# Patient Record
Sex: FEMALE | Race: WHITE | URBAN - METROPOLITAN AREA
[De-identification: names, ages, dates, MRNs, and addresses within clinical notes are randomized per-mention and may not be internally consistent; named-entity substitution may affect disease eponyms.]

---

## 2017-07-20 ENCOUNTER — TRANSFERRED RECORDS (OUTPATIENT)
Dept: HEALTH INFORMATION MANAGEMENT | Facility: CLINIC | Age: 57
End: 2017-07-20

## 2017-08-02 ENCOUNTER — TRANSFERRED RECORDS (OUTPATIENT)
Dept: HEALTH INFORMATION MANAGEMENT | Facility: CLINIC | Age: 57
End: 2017-08-02

## 2017-09-06 ENCOUNTER — TRANSFERRED RECORDS (OUTPATIENT)
Dept: HEALTH INFORMATION MANAGEMENT | Facility: CLINIC | Age: 57
End: 2017-09-06

## 2017-09-08 ENCOUNTER — TRANSFERRED RECORDS (OUTPATIENT)
Dept: HEALTH INFORMATION MANAGEMENT | Facility: CLINIC | Age: 57
End: 2017-09-08

## 2017-09-20 ENCOUNTER — TRANSFERRED RECORDS (OUTPATIENT)
Dept: HEALTH INFORMATION MANAGEMENT | Facility: CLINIC | Age: 57
End: 2017-09-20

## 2017-11-16 ENCOUNTER — OFFICE VISIT (OUTPATIENT)
Dept: RHEUMATOLOGY | Facility: CLINIC | Age: 57
End: 2017-11-16
Payer: COMMERCIAL

## 2017-11-16 VITALS — DIASTOLIC BLOOD PRESSURE: 86 MMHG | HEART RATE: 88 BPM | WEIGHT: 250 LBS | SYSTOLIC BLOOD PRESSURE: 182 MMHG

## 2017-11-16 DIAGNOSIS — M05.741 RHEUMATOID ARTHRITIS INVOLVING BOTH HANDS WITH POSITIVE RHEUMATOID FACTOR (H): Primary | ICD-10-CM

## 2017-11-16 DIAGNOSIS — M05.742 RHEUMATOID ARTHRITIS INVOLVING BOTH HANDS WITH POSITIVE RHEUMATOID FACTOR (H): Primary | ICD-10-CM

## 2017-11-16 PROCEDURE — 99203 OFFICE O/P NEW LOW 30 MIN: CPT | Performed by: INTERNAL MEDICINE

## 2017-11-16 RX ORDER — ALPRAZOLAM 1 MG
1 TABLET ORAL
COMMUNITY
Start: 2017-10-25 | End: 2018-01-23

## 2017-11-16 RX ORDER — SERTRALINE HYDROCHLORIDE 100 MG/1
100 TABLET, FILM COATED ORAL
COMMUNITY
Start: 2017-09-20 | End: 2018-03-19

## 2017-11-16 RX ORDER — TIZANIDINE 2 MG/1
TABLET ORAL
COMMUNITY
Start: 2017-09-20

## 2017-11-16 RX ORDER — ERGOCALCIFEROL 1.25 MG/1
50000 CAPSULE, LIQUID FILLED ORAL
COMMUNITY
Start: 2017-09-20 | End: 2017-12-19

## 2017-11-16 RX ORDER — BUPROPION HYDROCHLORIDE 300 MG/1
300 TABLET ORAL
COMMUNITY
Start: 2017-09-20 | End: 2018-03-19

## 2017-11-16 RX ORDER — PANTOPRAZOLE SODIUM 20 MG/1
TABLET, DELAYED RELEASE ORAL
COMMUNITY
Start: 2017-11-02

## 2017-11-16 NOTE — NURSING NOTE
Chief Complaint   Patient presents with     RECHECK     Dr. [facility]       Initial /86  Pulse 88  Wt 250 lb (113.4 kg) There is no height or weight on file to calculate BMI.      Patient prefers to be contacted via at Home.   Okay to leave detailed message: Yes  Patient uses MyChart: Yes    Alina NORRIS LPN

## 2017-11-16 NOTE — PROGRESS NOTES
CHIEF COMPLAINT:  Rheumatoid arthritis.     History:  Em Ramos is a 57-year-old female I am asked to evaluate for possible rheumatoid liver disease.  We discussed immediately rheumatoid arthritis does not affect the liver and that anything that would affect the liver would either be something along the line of autoimmune hepatitis or primary biliary cirrhosis, drug effects usually from something like methotrexate, etc.        The patient was seen for many years by Dr. Hein, perhaps for 15 years, is currently on Humira weekly, which she has done well on.  She really has no complaints currently, but does need refills of this prescription.  She does have a positive rheumatoid factor and positive CCP antibody.  It does not appear that these have necessarily been checked recently, but when last checked in this part of this workup, her NATHALY is positive at 1.4 with negative auto antibodies.  AST is 63, ALT 43.  Sed rate not checked.  CCP antibody in 2016 was negative.  Smooth muscle antibodies are negative.  Rheumatoid factor back in 2006 was 216.  She is negative for hepatitis B and C, TB testing is negative.  On further workup an ultrasound does reveal moderate severe fatty liver disease.      MEDICATIONS:   1.  Wellbutrin 300 mg daily.    2.  Humira 40 mg subcutaneously weekly.      DRUG ALLERGIES:  None.      SOCIAL HISTORY:  Not a smoker, does drink a glass of wine at least daily.      FAMILY HISTORY:  Mother also had rheumatoid arthritis.      REVIEW OF SYSTEMS:  As noted above.      PHYSICAL EXAMINATION:   VITAL SIGNS:  Blood pressure 182/86, pulse 88.   HEENT:  She is normocephalic and atraumatic.  Sclerae are clear and moist.  Oropharynx without lesions.   NECK:  Supple, without lymphadenopathy.   LUNGS:  Clear.   HEART:  Regular, without murmur.   ABDOMEN:  Nontender.   MUSCULOSKELETAL:  Joint exam there is no synovitis of the wrists, MCPs, or PIPs with the exception of there is mild synovitis of  bilateral second and third MCP joints.  There is no synovitis of the elbows, shoulders, knees or ankles.   SKIN:  Without lesions.      IMPRESSION:   1.  Seropositive rheumatoid arthritis.   2.  Fatty liver disease.      RECOMMENDATIONS:   1.  We will continue Humira 40 mg subcutaneously weekly.   2.  She should check labs every 6 months.   3.  I do not have any reason to think the rheumatoid arthritis in any way is affecting her liver.  It is likely that she has fatty liver disease as described previously.         RISHI DODSON MD             D: 2017 13:45   T: 2017 17:42   MT: [id number]      Name:     SAMRA WELCH   MRN:      [id number]        Account:      [id number]   :      1960           Visit Date:   2017      Document: [id number]       cc: Yesica Zhong NP

## 2017-11-17 ENCOUNTER — TELEPHONE (OUTPATIENT)
Dept: RHEUMATOLOGY | Facility: CLINIC | Age: 57
End: 2017-11-17

## 2017-11-17 DIAGNOSIS — M05.742 RHEUMATOID ARTHRITIS INVOLVING BOTH HANDS WITH POSITIVE RHEUMATOID FACTOR (H): ICD-10-CM

## 2017-11-17 DIAGNOSIS — M05.741 RHEUMATOID ARTHRITIS INVOLVING BOTH HANDS WITH POSITIVE RHEUMATOID FACTOR (H): ICD-10-CM

## 2017-11-17 NOTE — TELEPHONE ENCOUNTER
Reason for Call:  Medication clarification    Do you use a [facility]?  Name of the pharmacy and phone number for the current request:   Specialty Pharmacy    Name of the medication requested: Humira    Other request: Need to clarify directions - received 2 different rx - once weekly or once every 14 days?    Can we leave a detailed message on this number? YES    Phone number patient can be reached at: [phone]    Best Time: any    Call taken on 11/17/2017 at 3:10 PM by Lianne Ramsey

## 2017-11-17 NOTE — TELEPHONE ENCOUNTER
Please verify Humira is to be every 7 days   and not the standard every 14 days.    Beatriz Conley RN  [facility]

## 2018-01-10 ENCOUNTER — TELEPHONE (OUTPATIENT)
Dept: RHEUMATOLOGY | Facility: CLINIC | Age: 58
End: 2018-01-10

## 2018-01-10 NOTE — TELEPHONE ENCOUNTER
[facility] Prior Authorization Team   Phone: [phone]  Fax: [phone]    PA Initiation    Medication: Humira - PENDING   Insurance Company: [employer] - Phone [phone] Fax [phone]  Pharmacy Filling the Rx: [facility] - [address] [place] [address]  Filling Pharmacy Phone:    Filling Pharmacy Fax:    Start Date: 1/10/2018    Also sent recent chart notes.

## 2018-01-11 NOTE — TELEPHONE ENCOUNTER
Prior Authorization Approval    Authorization Effective Date: 1/10/2018  Authorization Expiration Date: 1/10/2020  Medication: Humira - APPROVED  Approved Dose/Quantity: N/A  Reference #: SEBAS CARLSON# [id number]   Insurance Company: [employer] - Phone [phone] Fax [phone]  Expected CoPay:       CoPay Card Available:      Foundation Assistance Needed:    Which Pharmacy is filling the prescription (Not needed for infusion/clinic administered): [address]  Pharmacy Notified:    Patient Notified:

## 2018-01-12 ENCOUNTER — TELEPHONE (OUTPATIENT)
Dept: RHEUMATOLOGY | Facility: CLINIC | Age: 58
End: 2018-01-12

## 2018-01-12 DIAGNOSIS — M05.742 RHEUMATOID ARTHRITIS INVOLVING BOTH HANDS WITH POSITIVE RHEUMATOID FACTOR (H): ICD-10-CM

## 2018-01-12 DIAGNOSIS — M05.741 RHEUMATOID ARTHRITIS INVOLVING BOTH HANDS WITH POSITIVE RHEUMATOID FACTOR (H): ICD-10-CM

## 2018-01-12 NOTE — TELEPHONE ENCOUNTER
[facility] faxed request stating they issa dispense the humira it need to go through [facility]. Please send new rx to [facility]    Sejal Arreola  Specialty [facility]

## 2018-01-12 NOTE — TELEPHONE ENCOUNTER
Authorization Effective Date: 1/10/2018  Authorization Expiration Date: 1/10/2020  Medication: Humira - APPROVED  Approved Dose/Quantity: N/A  Reference #: SEBAS CARLSON# [id number]   Insurance Company: [employer] - Phone [phone] Fax [phone]  Expected CoPay:       CoPay Card Available:      Foundation Assistance Needed:    Which Pharmacy is filling the prescription (Not needed for infusion/clinic administered): [address]    Remaining rx refills transferred to [place].  Beatriz Conley RN  [facility]

## 2018-01-21 ENCOUNTER — HEALTH MAINTENANCE LETTER (OUTPATIENT)
Age: 58
End: 2018-01-21

## 2020-03-11 ENCOUNTER — HEALTH MAINTENANCE LETTER (OUTPATIENT)
Age: 60
End: 2020-03-11

## 2020-12-27 ENCOUNTER — HEALTH MAINTENANCE LETTER (OUTPATIENT)
Age: 60
End: 2020-12-27

## 2021-04-25 ENCOUNTER — HEALTH MAINTENANCE LETTER (OUTPATIENT)
Age: 61
End: 2021-04-25

## 2021-10-09 ENCOUNTER — HEALTH MAINTENANCE LETTER (OUTPATIENT)
Age: 61
End: 2021-10-09

## 2022-03-26 ENCOUNTER — HEALTH MAINTENANCE LETTER (OUTPATIENT)
Age: 62
End: 2022-03-26

## 2022-05-21 ENCOUNTER — HEALTH MAINTENANCE LETTER (OUTPATIENT)
Age: 62
End: 2022-05-21

## 2022-09-17 ENCOUNTER — HEALTH MAINTENANCE LETTER (OUTPATIENT)
Age: 62
End: 2022-09-17

## 2023-06-04 ENCOUNTER — HEALTH MAINTENANCE LETTER (OUTPATIENT)
Age: 63
End: 2023-06-04

## 2025-06-09 ENCOUNTER — TRANSCRIBE ORDERS (OUTPATIENT)
Dept: OTHER | Age: 65
End: 2025-06-09

## 2025-06-09 DIAGNOSIS — K70.30 ALCOHOLIC CIRRHOSIS OF LIVER WITHOUT ASCITES (H): ICD-10-CM

## 2025-06-09 DIAGNOSIS — R74.8 ELEVATED ALKALINE PHOSPHATASE LEVEL: Primary | ICD-10-CM

## 2025-06-10 ENCOUNTER — PATIENT OUTREACH (OUTPATIENT)
Dept: CARE COORDINATION | Facility: CLINIC | Age: 65
End: 2025-06-10
Payer: COMMERCIAL

## 2025-06-19 NOTE — CONFIDENTIAL NOTE
DIAGNOSIS:    Elevated alkaline phosphatase level   Alcoholic cirrhosis of liver without ascites      Appt Date:  08.28.2025     NOTES STATUS DETAILS   OFFICE NOTE from referring provider Care Everywhere / Received 05.19.2025  Yesica Zhong NP [facility]    OFFICE NOTES from other specialists Care Everywhere 10.09.2024  Genia Barrientos APRN,CNP  [facility]   MEDICATION LIST Care Everywhere    LABS     HEPATIC PANEL (LIVER PANEL) Care Everywhere 10.09.2024    BASIC METABOLIC PANEL Care Everywhere 10.09.2024   COMPLETE METABOLIC PANEL Care Everywhere 10.09.2024   COMPLETE BLOOD COUNT (CBC) Care Everywhere 10.09.2024   INTERNATIONAL NORMALIZED RATIO (INR) Care Everywhere 10.09.2024   HEPATITIS C ANTIBODY Care Everywhere 12.29.2022   HEPATITIS B SURFACE ANTIGEN Care Everywhere 12.29.2023

## 2025-08-28 ENCOUNTER — PRE VISIT (OUTPATIENT)
Dept: GASTROENTEROLOGY | Facility: CLINIC | Age: 65
End: 2025-08-28
Payer: COMMERCIAL